# Patient Record
Sex: FEMALE | URBAN - METROPOLITAN AREA
[De-identification: names, ages, dates, MRNs, and addresses within clinical notes are randomized per-mention and may not be internally consistent; named-entity substitution may affect disease eponyms.]

---

## 2024-02-12 ENCOUNTER — LAB (OUTPATIENT)
Dept: URBAN - METROPOLITAN AREA SURGERY CENTER 16 | Facility: SURGERY CENTER | Age: 44
End: 2024-02-12

## 2024-02-19 ENCOUNTER — DAC (OUTPATIENT)
Dept: URBAN - METROPOLITAN AREA SURGERY CENTER 16 | Facility: SURGERY CENTER | Age: 44
End: 2024-02-19
Payer: COMMERCIAL

## 2024-02-19 ENCOUNTER — LAB (OUTPATIENT)
Dept: URBAN - METROPOLITAN AREA CLINIC 4 | Facility: CLINIC | Age: 44
End: 2024-02-19
Payer: COMMERCIAL

## 2024-02-19 DIAGNOSIS — Z12.11 COLON CANCER SCREENING: ICD-10-CM

## 2024-02-19 DIAGNOSIS — D12.8 ADENOMATOUS POLYP OF RECTUM: ICD-10-CM

## 2024-02-19 DIAGNOSIS — C18.9 MALIGNANT NEOPLASM OF COLON, UNSPECIFIED: ICD-10-CM

## 2024-02-19 PROCEDURE — 45380 COLONOSCOPY AND BIOPSY: CPT | Performed by: INTERNAL MEDICINE

## 2024-02-19 PROCEDURE — 88305 TISSUE EXAM BY PATHOLOGIST: CPT | Performed by: PATHOLOGY

## 2024-12-26 ENCOUNTER — TELEPHONE ENCOUNTER (OUTPATIENT)
Dept: URBAN - METROPOLITAN AREA CLINIC 92 | Facility: CLINIC | Age: 44
End: 2024-12-26

## 2025-02-04 ENCOUNTER — TELEPHONE ENCOUNTER (OUTPATIENT)
Dept: URBAN - METROPOLITAN AREA CLINIC 105 | Facility: CLINIC | Age: 45
End: 2025-02-04

## 2025-02-05 ENCOUNTER — OFFICE VISIT (OUTPATIENT)
Dept: URBAN - METROPOLITAN AREA CLINIC 105 | Facility: CLINIC | Age: 45
End: 2025-02-05
Payer: COMMERCIAL

## 2025-02-05 ENCOUNTER — OFFICE VISIT (OUTPATIENT)
Dept: URBAN - METROPOLITAN AREA CLINIC 105 | Facility: CLINIC | Age: 45
End: 2025-02-05

## 2025-02-05 ENCOUNTER — DASHBOARD ENCOUNTERS (OUTPATIENT)
Age: 45
End: 2025-02-05

## 2025-02-05 ENCOUNTER — LAB OUTSIDE AN ENCOUNTER (OUTPATIENT)
Dept: URBAN - METROPOLITAN AREA CLINIC 105 | Facility: CLINIC | Age: 45
End: 2025-02-05

## 2025-02-05 VITALS
TEMPERATURE: 97.3 F | SYSTOLIC BLOOD PRESSURE: 116 MMHG | HEIGHT: 63 IN | DIASTOLIC BLOOD PRESSURE: 76 MMHG | HEART RATE: 69 BPM | WEIGHT: 180.2 LBS | BODY MASS INDEX: 31.93 KG/M2

## 2025-02-05 DIAGNOSIS — D01.2 RECTAL CARCINOMA IN SITU: ICD-10-CM

## 2025-02-05 DIAGNOSIS — Z90.49 S/P CHOLECYSTECTOMY: ICD-10-CM

## 2025-02-05 DIAGNOSIS — K90.89 BILE SALT-INDUCED DIARRHEA: ICD-10-CM

## 2025-02-05 PROBLEM — 69980003: Status: ACTIVE | Noted: 2025-02-05

## 2025-02-05 PROBLEM — 428882003: Status: ACTIVE | Noted: 2025-02-05

## 2025-02-05 PROBLEM — 92696009: Status: ACTIVE | Noted: 2025-02-05

## 2025-02-05 PROCEDURE — 99204 OFFICE O/P NEW MOD 45 MIN: CPT | Performed by: INTERNAL MEDICINE

## 2025-02-05 RX ORDER — METRONIDAZOLE 500 MG/1
TAKE 1 TABLET BY MOUTH TWICE A DAY TABLET ORAL
Qty: 14 EACH | Refills: 0 | Status: ACTIVE | COMMUNITY

## 2025-02-05 RX ORDER — SULFAMETHOXAZOLE AND TRIMETHOPRIM 800; 160 MG/1; MG/1
TAKE 1 TABLET BY MOUTH EVERY 12 HOURS FOR 10 DAYS TABLET ORAL
Qty: 20 EACH | Refills: 0 | Status: ACTIVE | COMMUNITY

## 2025-02-05 RX ORDER — CEPHALEXIN 500 MG/1
TAKE 1 CAPSULE BY MOUTH FOUR TIMES A DAY FOR 7 DAYS CAPSULE ORAL
Qty: 28 EACH | Refills: 0 | Status: ACTIVE | COMMUNITY

## 2025-02-05 RX ORDER — NYSTATIN 100000 [USP'U]/G
APPLY TO AFFECTED AREA TWICE A DAY POWDER TOPICAL
Qty: 60 GRAM | Refills: 0 | Status: ACTIVE | COMMUNITY

## 2025-02-05 RX ORDER — NYSTATIN AND TRIAMCINOLONE ACETONIDE 100000; 1 [USP'U]/G; MG/G
APPLY 1 APPLICATION EXTERNALLY TWICE A DAY FOR 10 DAYS OINTMENT TOPICAL
Qty: 30 GRAM | Refills: 0 | Status: ACTIVE | COMMUNITY

## 2025-02-05 RX ORDER — COLESTIPOL HYDROCHLORIDE 1 G/1
2 TABLETS TABLET, FILM COATED ORAL ONCE A DAY
Qty: 60 | Refills: 2 | OUTPATIENT
Start: 2025-02-05

## 2025-02-05 RX ORDER — ESTRADIOL 0.1 MG/G
CREAM VAGINAL
Qty: 42.5 GRAM | Status: ACTIVE | COMMUNITY

## 2025-02-05 RX ORDER — PREDNISONE 10 MG/1
TAKE 2 TABLETS BY MOUTH EVERY DAY FOR 5 DAYS TABLET ORAL
Qty: 10 EACH | Refills: 0 | Status: ACTIVE | COMMUNITY

## 2025-02-05 RX ORDER — CLOTRIMAZOLE 10 MG/G
APPLY TO AFFECTED AREA TWICE A DAY CREAM TOPICAL
Qty: 60 GRAM | Refills: 0 | Status: ACTIVE | COMMUNITY

## 2025-02-05 RX ORDER — FLUCONAZOLE 200 MG/1
TAKE 1 TABLET BY MOUTH EVERY DAY FOR 10 DAYS TABLET ORAL
Qty: 10 EACH | Refills: 0 | Status: ACTIVE | COMMUNITY

## 2025-02-05 RX ORDER — CLOBETASOL PROPIONATE OINTMENT USP, 0.05% 0.5 MG/G
PLEASE SEE ATTACHED FOR DETAILED DIRECTIONS OINTMENT TOPICAL
Qty: 15 GRAM | Refills: 0 | Status: ACTIVE | COMMUNITY

## 2025-02-05 RX ORDER — AMOXICILLIN AND CLAVULANATE POTASSIUM 875; 125 MG/1; MG/1
TAKE 1 TABLET BY MOUTH EVERY 12 HOURS FOR 10 DAYS TABLET, FILM COATED ORAL
Qty: 20 EACH | Refills: 0 | Status: ACTIVE | COMMUNITY

## 2025-02-05 NOTE — HPI-TODAY'S VISIT:
The patient presents in follow-up after LAR w/ diverting loop ileostomy for rectal carcinoma in situ.  Today, she says she's the ileostomy reversal surgery w/ Dr. Miller and has been doing well since then. She can have up to 6 soft BMs/day. No watery BMs. Has occasional urgency, but less than before. She now has urgency with spicier foods. She can have anal soreness due to frequent BMs. She also sees blood with wiping after a BM, but none in the toilet. She has older siblings, she is unsure if they had colonoscopies.  PSH: cholecystectomy 14 years ago

## 2025-02-13 ENCOUNTER — OFFICE VISIT (OUTPATIENT)
Dept: URBAN - METROPOLITAN AREA MEDICAL CENTER 33 | Facility: MEDICAL CENTER | Age: 45
End: 2025-02-13
Payer: COMMERCIAL

## 2025-02-13 DIAGNOSIS — Z09 ENCOUNTER FOR COLONOSCOPY FOLLOWING COLON POLYP REMOVAL: ICD-10-CM

## 2025-02-13 DIAGNOSIS — Z85.048 H/O MALIGNANT NEOPLASM OF RECTUM: ICD-10-CM

## 2025-02-13 PROCEDURE — G0105 COLORECTAL SCRN; HI RISK IND: HCPCS | Performed by: INTERNAL MEDICINE

## 2025-03-05 ENCOUNTER — ERX REFILL RESPONSE (OUTPATIENT)
Dept: URBAN - METROPOLITAN AREA CLINIC 105 | Facility: CLINIC | Age: 45
End: 2025-03-05

## 2025-03-05 RX ORDER — COLESTIPOL HYDROCHLORIDE 1 G/1
TAKE 2 TABLETS BY MOUTH EVERY DAY FOR 30 DAYS TABLET, FILM COATED ORAL
Qty: 180 TABLET | Refills: 1 | OUTPATIENT

## 2025-03-05 RX ORDER — COLESTIPOL HYDROCHLORIDE 1 G/1
2 TABLETS TABLET, FILM COATED ORAL ONCE A DAY
Qty: 60 | Refills: 2 | OUTPATIENT

## 2025-05-05 ENCOUNTER — OFFICE VISIT (OUTPATIENT)
Dept: URBAN - METROPOLITAN AREA CLINIC 78 | Facility: CLINIC | Age: 45
End: 2025-05-05